# Patient Record
Sex: FEMALE | Race: WHITE | NOT HISPANIC OR LATINO | URBAN - METROPOLITAN AREA
[De-identification: names, ages, dates, MRNs, and addresses within clinical notes are randomized per-mention and may not be internally consistent; named-entity substitution may affect disease eponyms.]

---

## 2017-01-25 ENCOUNTER — COMMUNICATION - HEALTHEAST (OUTPATIENT)
Dept: FAMILY MEDICINE | Facility: CLINIC | Age: 42
End: 2017-01-25

## 2017-01-25 DIAGNOSIS — Z31.41 FERTILITY TESTING: ICD-10-CM

## 2017-02-13 ENCOUNTER — RECORDS - HEALTHEAST (OUTPATIENT)
Dept: ADMINISTRATIVE | Facility: OTHER | Age: 42
End: 2017-02-13

## 2017-10-26 ENCOUNTER — RECORDS - HEALTHEAST (OUTPATIENT)
Dept: ADMINISTRATIVE | Facility: OTHER | Age: 42
End: 2017-10-26

## 2017-10-26 ENCOUNTER — HOSPITAL ENCOUNTER (EMERGENCY)
Facility: CLINIC | Age: 42
Discharge: HOME | End: 2017-10-26
Attending: EMERGENCY MEDICINE | Admitting: EMERGENCY MEDICINE
Payer: COMMERCIAL

## 2017-10-26 VITALS
HEIGHT: 67 IN | SYSTOLIC BLOOD PRESSURE: 112 MMHG | WEIGHT: 125 LBS | TEMPERATURE: 98.9 F | BODY MASS INDEX: 19.62 KG/M2 | HEART RATE: 18 BPM | RESPIRATION RATE: 14 BRPM | OXYGEN SATURATION: 100 % | DIASTOLIC BLOOD PRESSURE: 77 MMHG

## 2017-10-26 DIAGNOSIS — R21 RASH: ICD-10-CM

## 2017-10-26 LAB
ANION GAP SERPL CALCULATED.3IONS-SCNC: 6 MMOL/L (ref 3–14)
BASOPHILS # BLD AUTO: 0.1 10E9/L (ref 0–0.2)
BASOPHILS NFR BLD AUTO: 1.3 %
BUN SERPL-MCNC: 13 MG/DL (ref 7–30)
CALCIUM SERPL-MCNC: 8.5 MG/DL (ref 8.5–10.1)
CHLORIDE SERPL-SCNC: 108 MMOL/L (ref 94–109)
CO2 SERPL-SCNC: 27 MMOL/L (ref 20–32)
CREAT SERPL-MCNC: 0.75 MG/DL (ref 0.52–1.04)
DIFFERENTIAL METHOD BLD: ABNORMAL
EOSINOPHIL # BLD AUTO: 0.3 10E9/L (ref 0–0.7)
EOSINOPHIL NFR BLD AUTO: 8.6 %
ERYTHROCYTE [DISTWIDTH] IN BLOOD BY AUTOMATED COUNT: 13.4 % (ref 10–15)
GFR SERPL CREATININE-BSD FRML MDRD: 86 ML/MIN/1.7M2
GLUCOSE SERPL-MCNC: 66 MG/DL (ref 70–99)
HCT VFR BLD AUTO: 42.5 % (ref 35–47)
HGB BLD-MCNC: 14.5 G/DL (ref 11.7–15.7)
IMM GRANULOCYTES # BLD: 0 10E9/L (ref 0–0.4)
IMM GRANULOCYTES NFR BLD: 0 %
LYMPHOCYTES # BLD AUTO: 1.2 10E9/L (ref 0.8–5.3)
LYMPHOCYTES NFR BLD AUTO: 31.4 %
MCH RBC QN AUTO: 31.9 PG (ref 26.5–33)
MCHC RBC AUTO-ENTMCNC: 34.1 G/DL (ref 31.5–36.5)
MCV RBC AUTO: 93 FL (ref 78–100)
MONOCYTES # BLD AUTO: 0.5 10E9/L (ref 0–1.3)
MONOCYTES NFR BLD AUTO: 12.7 %
NEUTROPHILS # BLD AUTO: 1.8 10E9/L (ref 1.6–8.3)
NEUTROPHILS NFR BLD AUTO: 46 %
NRBC # BLD AUTO: 0 10*3/UL
NRBC BLD AUTO-RTO: 0 /100
PLATELET # BLD AUTO: 256 10E9/L (ref 150–450)
POTASSIUM SERPL-SCNC: 3.5 MMOL/L (ref 3.4–5.3)
RBC # BLD AUTO: 4.55 10E12/L (ref 3.8–5.2)
SODIUM SERPL-SCNC: 141 MMOL/L (ref 133–144)
WBC # BLD AUTO: 3.9 10E9/L (ref 4–11)

## 2017-10-26 PROCEDURE — 96374 THER/PROPH/DIAG INJ IV PUSH: CPT

## 2017-10-26 PROCEDURE — 85025 COMPLETE CBC W/AUTO DIFF WBC: CPT | Performed by: EMERGENCY MEDICINE

## 2017-10-26 PROCEDURE — 99284 EMERGENCY DEPT VISIT MOD MDM: CPT | Mod: 25

## 2017-10-26 PROCEDURE — 86787 VARICELLA-ZOSTER ANTIBODY: CPT | Mod: 91 | Performed by: EMERGENCY MEDICINE

## 2017-10-26 PROCEDURE — 96372 THER/PROPH/DIAG INJ SC/IM: CPT

## 2017-10-26 PROCEDURE — 80048 BASIC METABOLIC PNL TOTAL CA: CPT | Performed by: EMERGENCY MEDICINE

## 2017-10-26 PROCEDURE — S0028 INJECTION, FAMOTIDINE, 20 MG: HCPCS | Performed by: EMERGENCY MEDICINE

## 2017-10-26 PROCEDURE — 25000125 ZZHC RX 250: Performed by: EMERGENCY MEDICINE

## 2017-10-26 PROCEDURE — 86787 VARICELLA-ZOSTER ANTIBODY: CPT | Performed by: EMERGENCY MEDICINE

## 2017-10-26 RX ORDER — ACYCLOVIR 800 MG/1
800 TABLET ORAL
Qty: 35 TABLET | Refills: 0 | Status: SHIPPED | OUTPATIENT
Start: 2017-10-26 | End: 2017-11-02

## 2017-10-26 RX ORDER — EPINEPHRINE 1 MG/ML
0.3 INJECTION, SOLUTION, CONCENTRATE INTRAVENOUS ONCE
Status: DISCONTINUED | OUTPATIENT
Start: 2017-10-26 | End: 2017-10-26

## 2017-10-26 RX ORDER — HYDROXYZINE HYDROCHLORIDE 25 MG/1
25 TABLET, FILM COATED ORAL 3 TIMES DAILY PRN
Qty: 20 TABLET | Refills: 0 | Status: SHIPPED | OUTPATIENT
Start: 2017-10-26

## 2017-10-26 RX ADMIN — FAMOTIDINE 20 MG: 10 INJECTION, SOLUTION INTRAVENOUS at 08:49

## 2017-10-26 RX ADMIN — EPINEPHRINE 0.3 MG: 1 INJECTION INTRAMUSCULAR; INTRAVENOUS; SUBCUTANEOUS at 08:59

## 2017-10-26 ASSESSMENT — ENCOUNTER SYMPTOMS
SORE THROAT: 0
CHILLS: 0
ARTHRALGIAS: 0
NAUSEA: 0
VOMITING: 0
FEVER: 0
RHINORRHEA: 0
HEADACHES: 0
ABDOMINAL PAIN: 0
SHORTNESS OF BREATH: 0
COUGH: 0
MYALGIAS: 0
DYSURIA: 0
FATIGUE: 1

## 2017-10-26 NOTE — ED AVS SNAPSHOT
Emergency Department    [address] [place] [address]    Phone:  [phone]    Fax:  [phone]                                       Madhavi Raya   MRN: [id number]    Department:   Emergency Department   Date of Visit:  10/26/2017           After Visit Summary Signature Page     I have received my discharge instructions, and my questions have been answered. I have discussed any challenges I see with this plan with the nurse or doctor.    ..........................................................................................................................................  Patient/Patient Representative Signature      ..........................................................................................................................................  Patient Representative Print Name and Relationship to Patient    ..................................................               ................................................  Date                                            Time    ..........................................................................................................................................  Reviewed by Signature/Title    ...................................................              ..............................................  Date                                                            Time

## 2017-10-26 NOTE — ED AVS SNAPSHOT
Emergency Department    [address] [facility] [address]    Phone:  [phone]    Fax:  [phone]                                       Madhavi Raya   MRN: [id number]    Department:   Emergency Department   Date of Visit:  10/26/2017           Patient Information     Date Of Birth          1975        Your diagnoses for this visit were:     Rash        You were seen by Terence Dixon MD.      Follow-up Information     Follow up with Jemima Saenz.    Specialty:  Family Practice    Why:  As needed, If symptoms worsen    Contact information:    [facility]  [address] [facility] [id number] [phone]          Follow up with  Emergency Department.    Specialty:  EMERGENCY MEDICINE    Why:  As needed, If symptoms worsen    Contact information:    [address] [facility] [address] [phone]      Discharge References/Attachments     CHICKENPOX (ADULT) (ENGLISH)    CHICKENPOX, UNDERSTANDING (ENGLISH)      24 Hour Appointment Hotline       To make an appointment at any [facility], call [phone] ([phone]). If you don't have a family doctor or clinic, we will help you find one. [facility] clinics are conveniently located to serve the needs of you and your family.             Review of your medicines      START taking        Dose / Directions Last dose taken    acyclovir 800 MG tablet   Commonly known as:  ZOVIRAX   Dose:  800 mg   Quantity:  35 tablet        Take 1 tablet (800 mg) by mouth 5 times daily for 7 days   Refills:  0        hydrOXYzine 25 MG tablet   Commonly known as:  ATARAX   Dose:  25 mg   Quantity:  20 tablet        Take 1 tablet (25 mg) by mouth 3 times daily as needed for itching   Refills:  0          Our records show that you are taking the medicines listed below. If these are incorrect, please call your family doctor or clinic.        Dose / Directions Last dose taken    METHYLPHENIDATE HCL PO         Refills:  0        TRINTELLIX PO        Refills:  0                Prescriptions were sent or printed at these locations (2 Prescriptions)                   Other Prescriptions                Printed at Department/Unit printer (2 of 2)         acyclovir (ZOVIRAX) 800 MG tablet               hydrOXYzine (ATARAX) 25 MG tablet                Procedures and tests performed during your visit     Basic metabolic panel    CBC with platelets differential    IV access    Varicella Zoster Virus Antibody IgG    Varicella zoster antibody IgM      Orders Needing Specimen Collection     None      Pending Results     Date and Time Order Name Status Description    10/26/2017 0836 Varicella zoster antibody IgM In process     10/26/2017 0836 Varicella Zoster Virus Antibody IgG In process             Pending Culture Results     No orders found from 10/24/2017 to 10/27/2017.            Pending Results Instructions     If you had any lab results that were not finalized at the time of your Discharge, you can call the ED Lab Result RN at [phone]. You will be contacted by this team for any positive Lab results or changes in treatment. The nurses are available 7 days a week from 10A to 6:30P.  You can leave a message 24 hours per day and they will return your call.        Test Results From Your Hospital Stay        10/26/2017  8:57 AM      Component Results     Component Value Ref Range & Units Status    WBC 3.9 (L) 4.0 - 11.0 10e9/L Final    RBC Count 4.55 3.8 - 5.2 10e12/L Final    Hemoglobin 14.5 11.7 - 15.7 g/dL Final    Hematocrit 42.5 35.0 - 47.0 % Final    MCV 93 78 - 100 fl Final    MCH 31.9 26.5 - 33.0 pg Final    MCHC 34.1 31.5 - 36.5 g/dL Final    RDW 13.4 10.0 - 15.0 % Final    Platelet Count 256 150 - 450 10e9/L Final    Diff Method Automated Method  Final    % Neutrophils 46.0 % Final    % Lymphocytes 31.4 % Final    % Monocytes 12.7 % Final    % Eosinophils 8.6 % Final    % Basophils 1.3 % Final    % Immature  Granulocytes 0.0 % Final    Nucleated RBCs 0 0 /100 Final    Absolute Neutrophil 1.8 1.6 - 8.3 10e9/L Final    Absolute Lymphocytes 1.2 0.8 - 5.3 10e9/L Final    Absolute Monocytes 0.5 0.0 - 1.3 10e9/L Final    Absolute Eosinophils 0.3 0.0 - 0.7 10e9/L Final    Absolute Basophils 0.1 0.0 - 0.2 10e9/L Final    Abs Immature Granulocytes 0.0 0 - 0.4 10e9/L Final    Absolute Nucleated RBC 0.0  Final         10/26/2017  9:15 AM      Component Results     Component Value Ref Range & Units Status    Sodium 141 133 - 144 mmol/L Final    Potassium 3.5 3.4 - 5.3 mmol/L Final    Chloride 108 94 - 109 mmol/L Final    Carbon Dioxide 27 20 - 32 mmol/L Final    Anion Gap 6 3 - 14 mmol/L Final    Glucose 66 (L) 70 - 99 mg/dL Final    Urea Nitrogen 13 7 - 30 mg/dL Final    Creatinine 0.75 0.52 - 1.04 mg/dL Final    GFR Estimate 86 >60 mL/min/1.7m2 Final    Non  GFR Calc    GFR Estimate If Black >90 >60 mL/min/1.7m2 Final    African American GFR Calc    Calcium 8.5 8.5 - 10.1 mg/dL Final         10/26/2017  8:51 AM         10/26/2017  8:51 AM                Clinical Quality Measure: Blood Pressure Screening     Your blood pressure was checked while you were in the emergency department today. The last reading we obtained was  BP: 112/77 . Please read the guidelines below about what these numbers mean and what you should do about them.  If your systolic blood pressure (the top number) is less than 120 and your diastolic blood pressure (the bottom number) is less than 80, then your blood pressure is normal. There is nothing more that you need to do about it.  If your systolic blood pressure (the top number) is 120-139 or your diastolic blood pressure (the bottom number) is 80-89, your blood pressure may be higher than it should be. You should have your blood pressure rechecked within a year by a primary care provider.  If your systolic blood pressure (the top number) is 140 or greater or your diastolic blood pressure  "(the bottom number) is 90 or greater, you may have high blood pressure. High blood pressure is treatable, but if left untreated over time it can put you at risk for heart attack, stroke, or kidney failure. You should have your blood pressure rechecked by a primary care provider within the next 4 weeks.  If your provider in the emergency department today gave you specific instructions to follow-up with your doctor or provider even sooner than that, you should follow that instruction and not wait for up to 4 weeks for your follow-up visit.        Thank you for choosing [facility]       Thank you for choosing [facility] for your care. Our goal is always to provide you with excellent care. Hearing back from our patients is one way we can continue to improve our services. Please take a few minutes to complete the written survey that you may receive in the mail after you visit with us. Thank you!        [employer] Information     [employer] lets you send messages to your doctor, view your test results, renew your prescriptions, schedule appointments and more. To sign up, go to [email or web address] . Click on \"Log in\" on the left side of the screen, which will take you to the Welcome page. Then click on \"Sign up Now\" on the right side of the page.     You will be asked to enter the access code listed below, as well as some personal information. Please follow the directions to create your username and password.     Your access code is: [email or web address]  Expires: 2018  9:50 AM     Your access code will  in 90 days. If you need help or a new code, please call your [facility] clinic or [phone].        Care EveryWhere ID     This is your Care EveryWhere ID. This could be used by other organizations to access your [facility] medical records  [id number]        Equal Access to Services     LEX BARRAZA : Neyda Villar, hilario gore, janette ireland. So axel " [phone].    ATENCIÓN: Si habla español, tiene a green disposición servicios gratuitos de asistencia lingüística. Llame al [phone].    We comply with applicable federal civil rights laws and [place] laws. We do not discriminate on the basis of race, color, national origin, age, disability, sex, sexual orientation, or gender identity.            After Visit Summary       This is your record. Keep this with you and show to your community pharmacist(s) and doctor(s) at your next visit.

## 2017-10-26 NOTE — LETTER
To Whom it may concern:      Madhavi Raya was seen in our Emergency Department today, 10/26/17.  I expect her condition to improve over the next few days.  She may return to work Oct 30.    Sincerely,        Terence Dixon MD

## 2017-10-26 NOTE — ED PROVIDER NOTES
History     Chief Complaint:  Rash     HPI   Madhavi Raya is an otherwise healthy 42 year old female who presents with a rash. The patient reports she was looking in the mirror yesterday afternoon and noticed a rash on her face and chest. She then checked to see if it was anywhere else on her body and noticed it was all over her body. The rash persisted this morning, so she called the nurse line and was told to go to the ED for evaluation and concern for chicken pox. She states she had chicken pox when she was a child. On arrival, the patient notes an itchy rash all over her body. She denies any discharge or drainage from any of the spots. She states she felt slightly fatigued last night, but attributes this to stress and exhaustion because her mother is ill. She otherwise denies any fever, cough, sore throat, runny nose, headache, congestion, abdominal pain, difficulty breathing, or chest pain. No recent illness. She denies any lesions in her mouth. She denies any new soaps, detergents, clothes, or any other new exposure. She denies any new medications. No recent travel. No one else at home is sick or has a rash. She hasn't taken anything for the rash at home.     Allergies:  No Known Allergies     Medications:    Vortioxetine HBr (TRINTELLIX PO)  METHYLPHENIDATE HCL PO    Past Medical History:    History reviewed. No pertinent past medical history.    Past Surgical History:    History reviewed. No pertinent surgical history.    Family History:    History reviewed. No pertinent family history.     Social History:  Smoking status: Current everyday smoker  Alcohol use: Yes, socially     Review of Systems   Constitutional: Positive for fatigue. Negative for chills and fever.   HENT: Negative for congestion, ear pain, rhinorrhea and sore throat.    Respiratory: Negative for cough and shortness of breath.    Cardiovascular: Negative for chest pain.   Gastrointestinal: Negative for abdominal pain, nausea and vomiting.  "  Genitourinary: Negative for dysuria.   Musculoskeletal: Negative for arthralgias and myalgias.   Skin: Positive for rash.   Neurological: Negative for headaches.   All other systems reviewed and are negative.      Physical Exam   Patient Vitals for the past 24 hrs:   BP Temp Temp src Heart Rate Resp SpO2 Height Weight   10/26/17 0931 - - - - - 100 % - -   10/26/17 0812 139/89 98.9  F (37.2  C) Oral 89 14 99 % 1.702 m (5' 7\") 56.7 kg (125 lb)       Physical Exam  General: Resting comfortably on the gurney  Head:  The scalp, face, and head appear normal  Eyes:  The pupils are equal, round, and reactive to light    There is no nystagmus    Extraocular muscles are intact    Conjunctivae and sclerae are normal  ENT:    The nose is normal    Pinnae are normal    The oropharynx is normal    Uvula is in the midline  Neck:  Normal range of motion    There is no rigidity noted    There is no midline cervical spine pain/tenderness    Trachea is in the midline    No mass is detected  GI:  Abdomen is soft, there is no rigidity    No distension    No tympani    No rebound tenderness     Non-surgical without peritoneal features  Skin:  There are innumerable erythematous papules as shown in the photos below. These involve the face, torso, and extremities.  There are no milli vesicles. These are blanchable and slightly pruritic as can be seen, there appears to be a small circumferential area around each papule that is more pale/white in color. All of the lesions appear to be roughly at the same stage.   Neuro: Speech is normal and fluent  Psych:  Awake. Alert.      Normal affect.  Appropriate interactions.  Lymph: No anterior cervical lymphadenopathy noted              Emergency Department Course   Laboratory:  CBC: WBC 3.9(L), o/w WNL (HGB 14.5, )   BMP: Glucose 66(L), o/w WNL (Creatinine 0.75)    Varicella zoster virus antibody IgG: in process  Varicella zoster antibody IgM: in process    Interventions:  0849: Pepcid 20 " mg IV  0859: Epinephrine 0.3 mg IM    Emergency Department Course:  Past medical records, nursing notes, and vitals reviewed.  0810: I performed an exam of the patient and obtained history, as documented above.    0943: I rechecked the patient. Her itching is improved but rash has not significantly improved.     0952: I rechecked the patient.  Findings and plan explained to the Patient. Patient discharged home with instructions regarding supportive care, medications, and reasons to return. The importance of close follow-up was reviewed.     Impression & Plan      Medical Decision Making:  This patient presents with a rash as noted above. The rash is a papular rash that is diffuse. No milli vesicles are seen, but several of the papules have the appearance that they may become papulovesicles. There is nothing that I can swab at this juncture. The symptoms just began. This could represent early varicella. This does not appear to represent urticaria. The patient had varicella antibody titers drawn. Disseminated zoster is in the differential diagnosis as well, although this is less likely. With her white count being low, this does likely indicate a viral process. She will be started empirically on Acyclovir pending blood work.     Diagnosis:    ICD-10-CM   1. Probably varicella R21   2.  Rash      Disposition: Discharged to home    Discharge Medications:  New Prescriptions    ACYCLOVIR (ZOVIRAX) 800 MG TABLET    Take 1 tablet (800 mg) by mouth 5 times daily for 7 days    HYDROXYZINE (ATARAX) 25 MG TABLET    Take 1 tablet (25 mg) by mouth 3 times daily as needed for itching     Rena Heaton  10/26/2017    EMERGENCY DEPARTMENT    I, Rena Heaton, am serving as a scribe at 8:10 AM on 10/26/2017 to document services personally performed by Terence Dixon MD based on my observations and the provider's statements to me.        Terence Dixon MD  10/26/17 1819

## 2017-10-27 ENCOUNTER — TELEPHONE (OUTPATIENT)
Dept: EMERGENCY MEDICINE | Facility: CLINIC | Age: 42
End: 2017-10-27

## 2017-10-27 LAB — VZV IGG SER QL IA: 2.4 AI (ref 0–0.8)

## 2017-10-27 NOTE — TELEPHONE ENCOUNTER
[facility] Emergency Department Lab result notification:    [facility] ED lab result protocol used  Herpes Zoster / Varicella protocol    Reason for call  Notify of lab results, assess symptoms,  review ED providers recommendations/discharge instructions (if necessary) and advise per ED lab result f/u protocol    Lab Result  Final result for Varicella Zoster Virus Antibody IgM is NEGATIVE.    No treatment or change in treatment per [facility] ED Lab Result protocol.    Final Varicella Zoster Virus Antibody IgG is POSITIVE    Antiviral medications prescribed upon discharge from the [facility] ED: Acyclovir (ZOVIRAX) 800 MG tablet, Take 1 tablet (800 mg) by mouth 5 times daily for 7 days  If treated in the [facility] ED, Notify patient of result.    Information table from ED Provider visit on 10/26/17  Symptoms reported at ED visit (Chief complaint, HPI) Madhavi Raya is an otherwise healthy 42 year old female who presents with a rash. The patient reports she was looking in the mirror yesterday afternoon and noticed a rash on her face and chest. She then checked to see if it was anywhere else on her body and noticed it was all over her body. The rash persisted this morning, so she called the nurse line and was told to go to the ED for evaluation and concern for chicken pox. She states she had chicken pox when she was a child. On arrival, the patient notes an itchy rash all over her body. She denies any discharge or drainage from any of the spots. She states she felt slightly fatigued last night, but attributes this to stress and exhaustion because her mother is ill. She otherwise denies any fever, cough, sore throat, runny nose, headache, congestion, abdominal pain, difficulty breathing, or chest pain. No recent illness. She denies any lesions in her mouth. She denies any new soaps, detergents, clothes, or any other new exposure. She denies any new medications. No recent travel. No one else at home is sick or  "has a rash. She hasn't taken anything for the rash at home.    ED providers Impression and Plan (applicable information) This patient presents with a rash as noted above. The rash is a papular rash that is diffuse. No milli vesicles are seen, but several of the papules have the appearance that they may become papulovesicles. There is nothing that I can swab at this juncture. The symptoms just began. This could represent early varicella. This does not appear to represent urticaria. The patient had varicella antibody titers drawn. Disseminated zoster is in the differential diagnosis as well, although this is less likely. With her white count being low, this does likely indicate a viral process. She will be started empirically on Acyclovir pending blood work.    Miscellaneous information N/A     RN Assessment (Patient s current Symptoms), include time called.  [Insert Left message here if message left]  Madhavi calling for results, did advise IgM still pending.  IgG +, reports she has had chicken pox as a child.  Today has more spots than when seen in ED, now \"more on my hands, ankles, feet and stomach\".  Spots that were affected while seen in ED are \"not as itchy\".  On today, (10/27/17) reports \"I have crazy spots all over my body\".  Did advise of today's results.  Questions answered as able.   Areas initially started with rash, reports improved \"itchiness\".  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    Please Contact your PCP clinic or return to the Emergency department if your:    Symptoms return.    Symptoms worsen or other concerning symptom's.    PCP follow-up Questions asked: YES       Shanelle Morfin RN    [facility] RN  Lung Nodule and ED Lab Results F/U RN  Epic pool (ED late result f/u RN) : [phone] [id number]  Ph # [phone]    Copy of Lab result   Order   Varicella zoster antibody IgM [[id number]] (Order [id number])   Exam " Information   Exam Date Exam Time Accession # Results    10/26/17  8:45 AM [id number]    Component Results   Component Value Flag Ref Range Units Status Collected Lab   Vari Zoster ERIC IGM 0.07  <=0.90 ISR Final 10/26/2017  8:45 AM [id number]   Comment:   (Note)   INTERPRETIVE INFORMATION: Varicella-Zoster Virus Antibody,   IgM    0.90 ISR or less ........ Negative - No significant                              level of detectable                              varicella-zoster virus                              IgM antibody.    0.91-1.09 ISR ........... Equivocal - Repeat testing in                              10-14 days may be helpful.    1.10 ISR or greater ..... Positive - Significant level                              of detectable varicella-zoster                              virus IgM antibody. Indicative                              of current or recent infection.                              However, low levels of IgM                              antibodies may occasionally                              persist for more than 12 months                              post-infection or immunization.        Order   Varicella Zoster Virus Antibody IgG [[id number]] (Order [id number])   Exam Information   Exam Date Exam Time Accession # Results    10/26/17  8:45 AM [id number]    Component Results   Component Value Flag Ref Range Units Status Collected Lab   Varicella Zoster Virus Antibody IgG 2.4 (H) 0.0 - 0.8 AI Final 10/26/2017  8:45 AM 51   Comment:

## 2017-10-28 ENCOUNTER — COMMUNICATION - HEALTHEAST (OUTPATIENT)
Dept: SCHEDULING | Facility: CLINIC | Age: 42
End: 2017-10-28

## 2017-10-28 LAB — VZV IGM SER IA-ACNC: 0.07 ISR

## 2017-10-30 ENCOUNTER — OFFICE VISIT - HEALTHEAST (OUTPATIENT)
Dept: FAMILY MEDICINE | Facility: CLINIC | Age: 42
End: 2017-10-30

## 2017-10-30 DIAGNOSIS — R21 RASH: ICD-10-CM

## 2017-12-27 ENCOUNTER — OFFICE VISIT - HEALTHEAST (OUTPATIENT)
Dept: FAMILY MEDICINE | Facility: CLINIC | Age: 42
End: 2017-12-27

## 2017-12-27 DIAGNOSIS — F90.9 ADHD (ATTENTION DEFICIT HYPERACTIVITY DISORDER): ICD-10-CM

## 2017-12-27 DIAGNOSIS — B36.0 TINEA VERSICOLOR: ICD-10-CM

## 2017-12-27 DIAGNOSIS — F32.A DEPRESSION: ICD-10-CM

## 2017-12-27 DIAGNOSIS — F17.200 TOBACCO USE DISORDER: ICD-10-CM

## 2017-12-28 ENCOUNTER — COMMUNICATION - HEALTHEAST (OUTPATIENT)
Dept: FAMILY MEDICINE | Facility: CLINIC | Age: 42
End: 2017-12-28

## 2018-02-02 ENCOUNTER — COMMUNICATION - HEALTHEAST (OUTPATIENT)
Dept: FAMILY MEDICINE | Facility: CLINIC | Age: 43
End: 2018-02-02

## 2018-02-02 DIAGNOSIS — N30.00 ACUTE CYSTITIS WITHOUT HEMATURIA: ICD-10-CM

## 2018-02-02 LAB
ALBUMIN UR-MCNC: NEGATIVE MG/DL
APPEARANCE UR: CLEAR
BACTERIA #/AREA URNS HPF: ABNORMAL HPF
BILIRUB UR QL STRIP: NEGATIVE
COLOR UR AUTO: YELLOW
GLUCOSE UR STRIP-MCNC: NEGATIVE MG/DL
HGB UR QL STRIP: NEGATIVE
KETONES UR STRIP-MCNC: NEGATIVE MG/DL
LEUKOCYTE ESTERASE UR QL STRIP: ABNORMAL
NITRATE UR QL: POSITIVE
PH UR STRIP: 6.5 [PH] (ref 5–8)
RBC #/AREA URNS AUTO: ABNORMAL HPF
SP GR UR STRIP: 1.02 (ref 1–1.03)
SQUAMOUS #/AREA URNS AUTO: ABNORMAL LPF
UROBILINOGEN UR STRIP-ACNC: ABNORMAL
WBC #/AREA URNS AUTO: ABNORMAL HPF
WBC CLUMPS #/AREA URNS HPF: PRESENT /[HPF]

## 2018-07-06 ENCOUNTER — COMMUNICATION - HEALTHEAST (OUTPATIENT)
Dept: FAMILY MEDICINE | Facility: CLINIC | Age: 43
End: 2018-07-06

## 2018-07-06 DIAGNOSIS — B36.0 TINEA VERSICOLOR: ICD-10-CM

## 2018-07-10 ENCOUNTER — RECORDS - HEALTHEAST (OUTPATIENT)
Dept: ADMINISTRATIVE | Facility: OTHER | Age: 43
End: 2018-07-10

## 2018-08-14 ENCOUNTER — COMMUNICATION - HEALTHEAST (OUTPATIENT)
Dept: FAMILY MEDICINE | Facility: CLINIC | Age: 43
End: 2018-08-14

## 2019-05-07 ENCOUNTER — RECORDS - HEALTHEAST (OUTPATIENT)
Dept: ADMINISTRATIVE | Facility: OTHER | Age: 44
End: 2019-05-07

## 2019-06-11 ENCOUNTER — OFFICE VISIT - HEALTHEAST (OUTPATIENT)
Dept: FAMILY MEDICINE | Facility: CLINIC | Age: 44
End: 2019-06-11

## 2019-06-11 DIAGNOSIS — S81.002D OPEN WOUND OF LEFT KNEE, LEG, AND ANKLE, SUBSEQUENT ENCOUNTER: ICD-10-CM

## 2019-06-11 DIAGNOSIS — S91.002D OPEN WOUND OF LEFT KNEE, LEG, AND ANKLE, SUBSEQUENT ENCOUNTER: ICD-10-CM

## 2019-06-11 DIAGNOSIS — S81.802D OPEN WOUND OF LEFT KNEE, LEG, AND ANKLE, SUBSEQUENT ENCOUNTER: ICD-10-CM

## 2019-06-20 ENCOUNTER — OFFICE VISIT - HEALTHEAST (OUTPATIENT)
Dept: FAMILY MEDICINE | Facility: CLINIC | Age: 44
End: 2019-06-20

## 2019-06-20 DIAGNOSIS — Z48.02 VISIT FOR SUTURE REMOVAL: ICD-10-CM

## 2019-07-12 ENCOUNTER — OFFICE VISIT - HEALTHEAST (OUTPATIENT)
Dept: FAMILY MEDICINE | Facility: CLINIC | Age: 44
End: 2019-07-12

## 2019-07-12 DIAGNOSIS — R21 RASH: ICD-10-CM

## 2019-07-12 DIAGNOSIS — Z20.7 EXPOSURE TO SCABIES: ICD-10-CM

## 2019-09-03 ENCOUNTER — COMMUNICATION - HEALTHEAST (OUTPATIENT)
Dept: FAMILY MEDICINE | Facility: CLINIC | Age: 44
End: 2019-09-03

## 2019-09-04 ENCOUNTER — COMMUNICATION - HEALTHEAST (OUTPATIENT)
Dept: FAMILY MEDICINE | Facility: CLINIC | Age: 44
End: 2019-09-04

## 2019-09-04 DIAGNOSIS — B36.0 TINEA VERSICOLOR: ICD-10-CM

## 2019-09-26 ENCOUNTER — OFFICE VISIT - HEALTHEAST (OUTPATIENT)
Dept: FAMILY MEDICINE | Facility: CLINIC | Age: 44
End: 2019-09-26

## 2019-09-26 DIAGNOSIS — N63.0 LUMP OR MASS IN BREAST: ICD-10-CM

## 2019-09-26 DIAGNOSIS — F17.200 TOBACCO USE DISORDER: ICD-10-CM

## 2019-09-26 DIAGNOSIS — F33.1 MODERATE EPISODE OF RECURRENT MAJOR DEPRESSIVE DISORDER (H): ICD-10-CM

## 2019-09-26 DIAGNOSIS — R03.0 ELEVATED BLOOD PRESSURE READING WITHOUT DIAGNOSIS OF HYPERTENSION: ICD-10-CM

## 2019-09-26 DIAGNOSIS — F43.22 ADJUSTMENT DISORDER WITH ANXIETY: ICD-10-CM

## 2019-09-26 DIAGNOSIS — Z12.31 VISIT FOR SCREENING MAMMOGRAM: ICD-10-CM

## 2019-09-26 ASSESSMENT — MIFFLIN-ST. JEOR: SCORE: 1237.33

## 2019-10-26 ENCOUNTER — COMMUNICATION - HEALTHEAST (OUTPATIENT)
Dept: FAMILY MEDICINE | Facility: CLINIC | Age: 44
End: 2019-10-26

## 2019-10-26 DIAGNOSIS — F17.200 TOBACCO USE DISORDER: ICD-10-CM

## 2019-10-28 ENCOUNTER — HOSPITAL ENCOUNTER (OUTPATIENT)
Dept: ULTRASOUND IMAGING | Facility: CLINIC | Age: 44
Discharge: HOME | End: 2019-10-28
Attending: FAMILY MEDICINE

## 2019-10-28 ENCOUNTER — HOSPITAL ENCOUNTER (OUTPATIENT)
Dept: MAMMOGRAPHY | Facility: CLINIC | Age: 44
Discharge: HOME | End: 2019-10-28
Attending: FAMILY MEDICINE

## 2019-10-28 DIAGNOSIS — Z80.3 FAMILY HISTORY OF BREAST CANCER: ICD-10-CM

## 2019-10-28 DIAGNOSIS — N63.0 BREAST LUMP: ICD-10-CM

## 2020-06-08 ENCOUNTER — RECORDS - HEALTHEAST (OUTPATIENT)
Dept: ADMINISTRATIVE | Facility: OTHER | Age: 45
End: 2020-06-08

## 2021-05-29 NOTE — PROGRESS NOTES
Assessment/Plan:        Diagnoses and all orders for this visit:    Open wound of left knee, leg, and ankle, subsequent encounter    Other orders  -     dextroamphetamine-amphetamine (ADDERALL) 10 mg Tab tablet  I did clean out the wound, and then redressed it without putting any more triple antibiotic.  I counseled her on the right way to dress it which she will continue to do at this time.  Also encouraged her to intermittently leave it open especially if she is going to be at home.  This is delighted to dry and hopefully encourage healing.  She will follow-up with her primary for stitch removal when it is due.        Subjective:    Patient ID: Madhavi Raya is a 43 y.o. female.    Injury   The incident occurred 3 to 5 days ago (Seen at the ER following a fall at which she sustained an injury to the left knee.Seen at the ER and sutured. Comes due to unexpected pain and some redness and wanted a review.). The incident occurred at home. The injury mechanism was a fall and a cut/puncture wound. The injury occurred in the context of work. No protective equipment was used. There is an injury to the left knee. The pain is moderate. It is unlikely that a foreign body is present. Associated symptoms include inability to bear weight. Pertinent negatives include no numbness, tingling or weakness. There have been no prior injuries to these areas. Tetanus status: given at the ER.       The following portions of the patient's history were reviewed and updated as appropriate: allergies, current medications, past family history, past medical history, past social history, past surgical history and problem list.    Review of Systems   Constitutional: Positive for fatigue. Negative for activity change.   HENT: Negative.    Cardiovascular: Negative for leg swelling.   Musculoskeletal: Positive for joint swelling. Negative for back pain.   Skin: Positive for wound.   Neurological: Negative for tingling, weakness and numbness.      Vitals:    06/11/19 1053 06/11/19 1130   BP: (!) 144/98 (!) 144/98   Pulse: 88    Resp: 20    Temp: 98.8  F (37.1  C)    TempSrc: Oral    Weight: 123 lb (55.8 kg)              Objective:    Physical Exam   Constitutional: She appears well-developed and well-nourished. She appears distressed.   Some distress due to pain.   HENT:   Head: Atraumatic.   Neck: Normal range of motion.   Cardiovascular: Intact distal pulses.   Pulmonary/Chest: Effort normal.   Neurological: She is alert.   Skin: Skin is warm.   She has on the anterior aspect of left knee about 2 cm below that a triangular-shaped laceration with the tip pointing medially.  It is held with stitches, some redness around it but not inflammation.  There is good healing and apposition.  There is also moderate amount of an ointment on the surface.  Does have some tenderness.

## 2021-05-29 NOTE — PROGRESS NOTES
ASSESSMENT/PLAN:  1. Visit for suture removal  This is a 43-year-old female who comes in today for suture removal of the left knee.  Sutures were placed about 11 days ago.  No complications with the wound.  Suture removal was completed with removal of 7 stitches without any complication.  Tdap was given in the emergency department 11 days ago    CHIEF COMPLAINT:  Chief Complaint   Patient presents with     [facility]       HISTORY OF PRESENT ILLNESS:  Madhavi is a 43 y.o. female presenting to the clinic today for follow-up post fall and injury to knee that resulted in 7 stitches.     Wound on left knee: On 6/9/2019 she fell into an egress window and her knee hit a metal latch that is used to lock the window. She was seen in the [facility] ED and received 7 stitches inferior to her left knee. She reports that the pain is manageable. She denies pus or fluid drainage. She is excited to have the stitches removed today.     REVIEW OF SYSTEMS:   Constitutional: Negative.   HENT: Negative.   Eyes: Negative.   Respiratory: Negative.   Cardiovascular: Negative.   Gastrointestinal: Negative.   Endocrine: Negative.   Genitourinary: Negative.   Musculoskeletal: Negative.   Skin: Negative.   Allergic/Immunologic: Negative.   Neurological: Negative.   Hematological: Negative.   Psychiatric/Behavioral: Negative.   All other systems are negative.    TOBACCO USE:  Social History     Tobacco Use   Smoking Status Current Some Day Smoker   Smokeless Tobacco Never Used       VITALS:  Vitals:    06/20/19 1510   BP: 134/82   Patient Site: Left Arm   Patient Position: Sitting   Cuff Size: Adult Regular   Pulse: 60   Weight: 120 lb 3 oz (54.5 kg)     Wt Readings from Last 3 Encounters:   06/20/19 120 lb 3 oz (54.5 kg)   06/11/19 123 lb (55.8 kg)   06/09/19 125 lb (56.7 kg)       PHYSICAL EXAM:  Constitutional: Patient is oriented to person, place, and time. Patient appears well-developed and  well-nourished. No distress.   Head: Normocephalic and atraumatic.   Right Ear: External ear normal.   Left Ear: External ear normal.   Nose: Nose normal.   Eyes: Conjunctivae and EOM are normal. Right eye exhibits no discharge. Left eye exhibits no discharge. No scleral icterus.   Neurological: Patient is alert and oriented to person, place, and time. Patient has normal reflexes. No cranial nerve deficit. Coordination normal.   Skin: No rash noted. Patient is not diaphoretic. No erythema. No pallor. Wound inferior to left knee is clean, dry, and intact.       MEDICATIONS:  Current Outpatient Medications   Medication Sig Dispense Refill     clindamycin-benzoyl peroxide (BENZACLIN) gel        dextroamphetamine-amphetamine (ADDERALL) 10 mg Tab tablet        hydrOXYzine (ATARAX) 25 MG tablet Take once daily as needed for anxiety 30 tablet 0     hydrOXYzine pamoate (VISTARIL) 25 MG capsule Take 1 capsule (25 mg total) by mouth every 6 (six) hours as needed for itching. 6 capsule 0     ketoconazole (NIZORAL) 2 % shampoo Apply to affected areas daily as needed 120 mL 3     lisdexamfetamine (VYVANSE) 20 MG capsule Take 1 capsule (20 mg total) by mouth every morning. 30 capsule 0     MULTIVITAMIN ORAL Take 1 tablet by mouth daily.       nicotine (NICODERM CQ) 7 mg/24 hr Place 1 patch on the skin daily. 30 patch 0     nicotine polacrilex (NICORETTE) 2 mg gum Apply 1 each (2 mg total) to the mouth or throat as needed for smoking cessation. 30 each 0     NUVARING 0.12-0.015 mg/24 hr vaginal ring INSERT 1 RING VAGINALLY. KEEP IN PLACE FOR 3 WEEKS, REMOVE FOR 1 WEEK, THEN REPLACE WITH NEW RING 3 each 3     propranolol (INDERAL) 10 MG tablet Take one pill as need for anxiety       TRINTELLIX 10 mg Tab tablet Take 1 tablet (10 mg total) by mouth daily. May increase to 2 ab daily, if ok with psychiatrist 60 tablet 0     No current facility-administered medications for this visit.        ADDITIONAL HISTORY SUMMARIZED (2): 6/9/2019  ED note regarding injury to left knee reviewed.  DECISION TO OBTAIN EXTRA INFORMATION (1): None.   RADIOLOGY TESTS (1): None.  LABS (1): None.  MEDICINE TESTS (1): None.  INDEPENDENT REVIEW (2 each): None.     The visit lasted a total of 5 minutes face to face with the patient. Over 50% of the time was spent counseling and educating the patient about injury to left knee.    INeema, am scribing for and in the presence of, Dr. Velasquez.    I, Dr. Velasquez, personally performed the services described in this documentation, as scribed by Neema Ortiz in my presence, and it is both accurate and complete.    Total data points:2

## 2021-05-31 VITALS — WEIGHT: 123.06 LBS | BODY MASS INDEX: 19.27 KG/M2

## 2021-05-31 VITALS — BODY MASS INDEX: 19.01 KG/M2 | WEIGHT: 121.38 LBS

## 2021-06-01 NOTE — PROGRESS NOTES
ASSESSMENT/PLAN:  Lump or mass in breast  This is a 44-year-old female who comes in today for a palpable lump on her left breast.  On examination today, I was not able to appreciate a lump.  Diagnostic mammogram and ultrasound at this time is not warranted.  Her mom had breast cancer and  recently.  The patient had a mammogram many years ago.  I recommend a screening mammogram as soon as possible.      Nicotine Dependence  Smoking cessation counseled.  Patient had tried bupropion previously but she had side effects which she thought was related to taking Adderall at the same time.  We will try Chantix.  She will continue with nicotine gum.  Motivational interviewing was utilized today  -     varenicline (CHANTIX) 1 mg tablet; Start with 1/2 tab daily x 4 d, 1/2 tab twice daily x 4d, then one tablet twice daily    Moderate episode of recurrent major depressive disorder (H), Adjustment disorder with anxiety  Recent death of her mom from breast cancer.  The patient sees a psychiatrist.  She is on medication for depression and anxiety but is unable to recall the name.  She also sees psychiatry for ADHD    Visit for screening mammogram  -     Mammo Screening Bilateral; Future    Elevated blood pressure reading without diagnosis of hypertension  Monitor for now    Other orders  -     Influenza, Recombinant, Inj, Quadrivalent, PF, 18+YRS        There are no Patient Instructions on file for this visit.    Orders Placed This Encounter   Procedures     Mammo Screening Bilateral     Standing Status:   Future     Standing Expiration Date:   2020     Order Specific Question:   Is the patient pregnant?     Answer:   No     Order Specific Question:   Can the procedure be changed per Radiologist protocol?     Answer:   Yes     Influenza, Recombinant, Inj, Quadrivalent, PF, 18+YRS           CHIEF COMPLAINT:  Chief Complaint   Patient presents with     lump in left breast     x 1 week no pain       HISTORY OF PRESENT  ILLNESS:  Madhavi is a 44 y.o. female presenting to the clinic today for a  lump in her right breast and medication check.     Lump in Right Breast: She noticed a lump on the lateral right breast. She did not injury it and it is not painful. She denies any discharge. She says that her nipples are sensitive but she just got her period so that may be why.     ADHD: She is taking 15 mg of Adderall through her psychiatrist. She also has a short acting Adderall that she thinks is also 15 mg. She likes her dosages for her medications.     Nicotine Dependence: She has not been taking bupropion 150 mg. She stopped taking it because it changed the way adderall made her feel. She has not been using the nicotine patch or gum. If she is going to stop smoking, she would like to get back on medication. However, she would like to find something other than bupropion.      Mental Health: She sees her psychiatrist once every three months. She is no longer taking trintellix. She says that her psychiatrist started her on a new depression medication but she cannot recall what medication.     REVIEW OF SYSTEMS:   Constitutional: Negative.   HENT: Negative.   Eyes: Negative.   Respiratory: Negative.   Cardiovascular: Negative.   Gastrointestinal: Negative.   Endocrine: Negative.   Genitourinary: Negative.   Musculoskeletal: Negative.   Skin: Negative.   Allergic/Immunologic: Negative.   Neurological: Negative.   Hematological: Negative.   Psychiatric/Behavioral: Negative.   All other systems are negative.    PFSH:  Her mom just passed away. Her  was this weekend. Her mom  from breast cancer. Her mom was diagnosed when she was in her 50's. She is still smoking tobacco. She has had one mammogram in the past.      TOBACCO USE:  Social History     Tobacco Use   Smoking Status Current Every Day Smoker     Types: Cigarettes   Smokeless Tobacco Never Used       VITALS:  Vitals:    19 0922 19 0949   BP: (!) 126/98 (!) 140/98  "  Patient Site: Left Arm Left Arm   Patient Position: Sitting Sitting   Cuff Size: Adult Regular Adult Regular   Pulse: 84    Weight: 121 lb 3 oz (55 kg)    Height: 5' 7\" (1.702 m)      Wt Readings from Last 3 Encounters:   09/26/19 121 lb 3 oz (55 kg)   07/12/19 115 lb 14.4 oz (52.6 kg)   06/20/19 120 lb 3 oz (54.5 kg)       PHYSICAL EXAM:  Constitutional: Patient is oriented to person, place, and time. Patient appears well-developed and well-nourished. No distress.   Head: Normocephalic and atraumatic.   Right Ear: External ear normal.   Left Ear: External ear normal.   Nose: Nose normal.   Breasts:  Normal appearing, no skin involvement, no palpable mass, no tenderness on palpation.  No axillary involvement. Firmness of soft tissue at the 10 o'clock position of right breast around 3-4 mm from the nipple.   Neurological: Patient is alert and oriented to person, place, and time.   Skin: Skin is warm and dry. No rash noted. Patient is not diaphoretic. No erythema. No pallor.  The patient has good eye contact.  No psychomotor retardation or stereotypical behaviors.  Speech was regular rate, regular rhythm, adequate responses.  Mood was down and affect was congruent mood.  No suicidal or homicidal intent.  No hallucination.    QUALITY MEASURES:  The following are part of a depression follow up plan for the patient:  case management follow-up and patient follow-up to return when and if necessary  I have counseled the patient for tobacco cessation and prescribed the patient a tobacco cessation medication and the follow up will occur  at the next visit.    ADDITIONAL HISTORY SUMMARIZED (2): Reviewed walk-in note from 7/12/19 about a rash.  DECISION TO OBTAIN EXTRA INFORMATION (1): None.   RADIOLOGY TESTS (1): Ordered a mammogram today.  LABS (1): None.  MEDICINE TESTS (1): None.  INDEPENDENT REVIEW (2 each): None.     Laura HARRIS,  am scribing for and in the presence of, Dr. Velasquez.    IDr. Velasquez, personally " performed the services described in this documentation, as scribed by Laura Crowley in my presence, and it is both accurate and complete.    MEDICATIONS:  Current Outpatient Medications   Medication Sig Dispense Refill     ADDERALL XR 15 mg 24 hr capsule        clindamycin-benzoyl peroxide (BENZACLIN) gel        dextroamphetamine-amphetamine (ADDERALL) 10 mg Tab tablet        ketoconazole (NIZORAL) 2 % shampoo Apply to affected areas daily as needed 120 mL 3     MULTIVITAMIN ORAL Take 1 tablet by mouth daily.       nicotine (NICODERM CQ) 7 mg/24 hr Place 1 patch on the skin daily. 30 patch 0     nicotine polacrilex (NICORETTE) 2 mg gum Apply 1 each (2 mg total) to the mouth or throat as needed for smoking cessation. 30 each 0     varenicline (CHANTIX) 1 mg tablet Start with 1/2 tab daily x 4 d, 1/2 tab twice daily x 4d, then one tablet twice daily 60 tablet 0     No current facility-administered medications for this visit.        Total data points: 3

## 2021-06-01 NOTE — TELEPHONE ENCOUNTER
RN cannot approve Refill Request    RN can NOT refill this medication med is not covered by policy/route to provider. Last office visit: 6/20/2019 Jemima Saenz MD Last Physical: 12/8/2016 Last MTM visit: Visit date not found Last visit same specialty: 6/20/2019 Jemima Saenz MD.  Next visit within 3 mo: Visit date not found  Next physical within 3 mo: Visit date not found      Vicki Del Valle, [facility] Triage/Med Refill 9/4/2019    Requested Prescriptions   Pending Prescriptions Disp Refills     ketoconazole (NIZORAL) 2 % shampoo 120 mL 3     Sig: Apply to affected areas daily as needed       There is no refill protocol information for this order

## 2021-06-02 NOTE — TELEPHONE ENCOUNTER
Refill Approved    Rx renewed per Medication Renewal Policy. Medication was last renewed on 12/27/2017       Sam De La Cruz, [facility] [place] Connection Triage/Med Refill 10/26/2019     Requested Prescriptions   Pending Prescriptions Disp Refills     nicotine (NICODERM CQ) 7 mg/24 hr 30 patch 0     Sig: Place 1 patch on the skin daily.       Nicotine Products Refill Protocol Passed - 10/26/2019  1:42 AM        Passed - PCP or prescribing provider visit in last 12      Last office visit with prescriber/PCP: 9/26/2019 Jemima Saenz MD OR same dept: 9/26/2019 Jemima Saenz MD OR same specialty: 9/26/2019 Jemima Saenz MD  Last physical: 12/8/2016 Last MTM visit: Visit date not found   Next visit within 3 mo: Visit date not found  Next physical within 3 mo: Visit date not found  Prescriber OR PCP: Jemima Covarrubias MD  Last diagnosis associated with med order: 1. Nicotine Dependence  - nicotine (NICODERM CQ) 7 mg/24 hr; Place 1 patch on the skin daily.  Dispense: 30 patch; Refill: 0    If protocol passes may refill for 12 months if within 3 months of last provider visit (or a total of 15 months).

## 2021-06-03 VITALS — BODY MASS INDEX: 18.82 KG/M2 | WEIGHT: 120.19 LBS

## 2021-06-03 VITALS — BODY MASS INDEX: 18.15 KG/M2 | WEIGHT: 115.9 LBS

## 2021-06-03 VITALS
BODY MASS INDEX: 19.02 KG/M2 | WEIGHT: 121.19 LBS | HEIGHT: 67 IN | DIASTOLIC BLOOD PRESSURE: 98 MMHG | SYSTOLIC BLOOD PRESSURE: 140 MMHG | HEART RATE: 84 BPM

## 2021-06-03 VITALS — BODY MASS INDEX: 19.26 KG/M2 | WEIGHT: 123 LBS

## 2021-06-13 NOTE — PROGRESS NOTES
Assessment/Plan:        Diagnoses and all orders for this visit:    Rash    Other orders  -     Cancel: acyclovir (ZOVIRAX) 800 MG tablet; Take 1 tablet (800 mg total) by mouth 2 (two) times a day for 10 days.  Dispense: 20 tablet; Refill: 0        Does not have the full typical appearance of chickenpox.  She was started on acyclovir the next day and could suppress the appearance.  We will have her continue with the cycle of year.  2 continue with hydroxyzine as needed.  Okay to go back to work but if she starts having fever, blister lesions are discharged then to hold off and including precautionary measures with contact with her mom.  Follow-up with PCP if symptoms persist and are worse.  She was agreeable with the plans.  Subjective:    Patient ID: Madhavi Raya is a 42 y.o. female.    TAO Hendrickson is here for follow-up from her recent visit with ER.  She developed a rash 5 days ago.  Looking at her face and noted his bumps in her face.  She then noticed it also involving her neck.  When she tried to check her body, she had it in her chest and abdomen.  The next day, it seems to spread further into her trunks and extremities.  No fever, chills with it.  No blister lesions that she recalls off.  She has had chickenpox in the past.  No exposure to anyone with similar rashes.  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  Most of them are healthy overall.  She is tired recently, exhausted but has been doing a lot of things and bring her mom to [facility] for treatments, chemo.  The lesions are itchy.  She was seen at an ER.  Was treated with acyclovir 800 mg 5 times per day for 7 days.  She was able to start it 4 days ago.  The rashes in her abdomen seems to be less.  Does not have any other new rashes.  She also notes itchiness.  Has been given hydroxyzine which helps.  She has a history of sun allergy and the itching is not as bad as compared to her son allergy.  She denies any new skin  products.  No new pets.  No recent travel.  No exposure to anyone with similar rashes.  No changes in her diet.    Review of Systems  As above otherwise negative.          Objective:    Physical Exam  /70 (Patient Site: Right Arm, Patient Position: Sitting, Cuff Size: Adult Regular)  Pulse 80  Temp 98.2  F (36.8  C)  Wt 121 lb 6 oz (55.1 kg)  Breastfeeding? No  BMI 19.24 kg/m2    Vital signs noted above. AAO ×3.  HEENT no nasal discharge, moist oral mucosa.  No mucosal lesions.  Neck: Supple neck, nonpalpable cervical lymph nodes.  Lungs: Clear to auscultation bilateral.  Heart: S1-S2 regular rate and rhythm, no murmurs were noted.  Abdomen:  with bowel sounds.  Extremities: No edema, pulses were full and equal.  Skin: Erythematous maculopapular lesions involving her trunks, extremities.  Minimally on her hands, not on her palmar region.  No vesicular lesions, discharge, bleeding.  A few of them had dry scab but possibly from her scratching on it.

## 2021-06-15 NOTE — PROGRESS NOTES
ASSESSMENT/PLAN:  Nicotine Dependence  Cessation counseled  She has wellbutrin at home and will resume it  -     nicotine (NICODERM CQ) 7 mg/24 hr; Place 1 patch on the skin daily.  Dispense: 30 patch; Refill: 0  -     nicotine polacrilex (NICORETTE) 2 mg gum; Apply 1 each (2 mg total) to the mouth or throat as needed for smoking cessation.  Dispense: 30 each; Refill: 0  -     nicotine (NICODERM CQ) 14 mg/24 hr; Place 1 patch on the skin daily.  Dispense: 30 patch; Refill: 0    ADHD (attention deficit hyperactivity disorder)  Refilled.  Further refills will be through her psychiatry  -     lisdexamfetamine (VYVANSE) 20 MG capsule; Take 1 capsule (20 mg total) by mouth every morning.  Dispense: 30 capsule; Refill: 0    Tinea versicolor  refilled  -     ketoconazole (NIZORAL) 2 % shampoo; Apply to affected areas daily as needed  Dispense: 120 mL; Refill: 3    Depression  PHQ9=13  Refilled  Mom recently  from complications of cancer.  May increase to 20mg, but must get ok by her psychiatrist  -     TRINTELLIX 10 mg Tab tablet; Take 1 tablet (10 mg total) by mouth daily. May increase to 2 ab daily, if ok with psychiatrist  Dispense: 60 tablet; Refill: 0    CHIEF COMPLAINT:  Chief Complaint   Patient presents with     med check       HISTORY OF PRESENT ILLNESS:  Madhavi is a 42 y.o. female presenting to the clinic today for a follow-up of anxiety and depression. Her mother recently passed from cancer. She notes that her mood is labile with her mother's passing but has a strong family support system. She wanted to ask her psychiatrist if she could increase her Trintellix dose for a temporary amount of time while she bryanna. She is inquiring if she could receive refills on her prescriptions as she has reached her out-of-pocket deductible.     ADHD: She states that her Vyvanse is helpful for her ADHD.     REVIEW OF SYSTEMS:   Constitutional: Negative.   HENT: Negative.   Eyes: Negative.   Respiratory: Negative.    Cardiovascular: Negative.   Gastrointestinal: Negative.   Endocrine: Negative.   Genitourinary: Negative.   Musculoskeletal: Negative.   Skin: She is inquiring about ketoconazole for her tinea versicolor.   Allergic/Immunologic: Negative.   Neurological: Negative.   All other systems are negative.    PFSH:  Smoking Cessation: She notes that she has started smoking again but would like to stop. She is inquiring about a prescription for smoking cessation. She has previously taken Wellbutrin which improved her ability to quit smoking. She also would like nicotine gum and patches.      TOBACCO USE:  History   Smoking Status     Current Some Day Smoker   Smokeless Tobacco     Never Used       VITALS:  Vitals:    12/27/17 1506   BP: 144/90   Pulse: 64   Weight: 123 lb 1 oz (55.8 kg)     Wt Readings from Last 3 Encounters:   12/27/17 123 lb 1 oz (55.8 kg)   10/30/17 121 lb 6 oz (55.1 kg)   12/08/16 120 lb 5 oz (54.6 kg)       PHYSICAL EXAM:  Constitutional: Patient is oriented to person, place, and time. Patient appears well-developed and well-nourished. No distress.   Cardiovascular: Normal rate.  Pulmonary/Chest: Effort normal. No respiratory distress.   Neurological: Patient is alert and oriented to person, place, and time.  The patient has good eye contact.  No psychomotor retardation or stereotypical behaviors.  Speech is regular rate, regular rhythm, adequate responses.  Mood is stable and affect is congruent mood.  No suicidal or homicidal intent.  No hallucination.      QUALITY MEASURES:  I have counseled the patient for tobacco cessation and the follow up will occur  at the next visit.    ADDITIONAL HISTORY SUMMARIZED (2): None.  DECISION TO OBTAIN EXTRA INFORMATION (1): None.   RADIOLOGY TESTS (1): None.  LABS (1): None.  MEDICINE TESTS (1): None.  INDEPENDENT REVIEW (2 each): None.     Onofre HARRIS, am scribing for and in the presence of, Dr. Velasquez.    Jemima HARRIS MD , personally  performed the services described in this documentation, as scribed by Argelia Guerin in my presence, and it is both accurate and complete.    MEDICATIONS:  Current Outpatient Prescriptions   Medication Sig Dispense Refill     clindamycin-benzoyl peroxide (BENZACLIN) gel        ketoconazole (NIZORAL) 2 % shampoo Apply to affected areas daily as needed 120 mL 3     lisdexamfetamine (VYVANSE) 20 MG capsule Take 1 capsule (20 mg total) by mouth every morning. 30 capsule 0     MULTIVITAMIN ORAL Take 1 tablet by mouth daily.       propranolol (INDERAL) 10 MG tablet Take one pill as need for anxiety       TRINTELLIX 10 mg Tab tablet Take 1 tablet (10 mg total) by mouth daily. May increase to 2 ab daily, if ok with psychiatrist 60 tablet 0     hydrOXYzine (ATARAX) 25 MG tablet Take once daily as needed for anxiety 30 tablet 0     nicotine (NICODERM CQ) 14 mg/24 hr Place 1 patch on the skin daily. 30 patch 0     nicotine (NICODERM CQ) 7 mg/24 hr Place 1 patch on the skin daily. 30 patch 0     nicotine polacrilex (NICORETTE) 2 mg gum Apply 1 each (2 mg total) to the mouth or throat as needed for smoking cessation. 30 each 0     NUVARING 0.12-0.015 mg/24 hr vaginal ring INSERT PER VAGINA EVERY WEEK FOR 3 WEEKS. REMOVE FOR 1 WEEK THEN INSERT A NEW ONE AS DIRECTED 3 each 0     No current facility-administered medications for this visit.        Total data points:0

## 2021-06-17 NOTE — PATIENT INSTRUCTIONS - HE
Patient Instructions by Matthew Bryan PA-C at 7/12/2019 10:10 AM     Author: Matthew Bryan PA-C Service: -- Author Type: Physician Assistant    Filed: 7/12/2019 10:39 AM Encounter Date: 7/12/2019 Status: Addendum    : Matthew Bryan PA-C (Physician Assistant)    Related Notes: Original Note by Matthew Bryan PA-C (Physician Assistant) filed at 7/12/2019 10:39 AM       Avoid skin to skin contact while being treated.  You may continue to work as long as you do not have skin to skin contact.  Use of over-the-counter Zyrtec for the next 2 weeks for itching.  Itching may persist after the treatment.  Follow-up with your primary care provider if not getting 100% resolution of new symptoms or concerns arise.      Patient Education     Scabies  Scabies is a skin infection. It is caused by a tiny parasitic insect, or mite, that is too small to see directly. It can be seen under a microscope, but it is usually recognized only by the rash and symptoms it causes. This can make it hard to diagnose since the signs and symptoms can be similar to other diseases.  The scabies mite tunnels under the skin. It creates a small burrow, where it leaves its eggs. These eggs li and grow into adults. They then create new burrows over the next 1 to 2 weeks. The mites die in about 4 to 6 weeks. The rash and itching are caused by an allergic reaction to the scabies saliva or feces.  Scabies is highly contagious. It is spread by direct skin contact. It is easily spread by close personal contact, sexual contact, or by sharing bed linens or clothing used by an infected person.  It may take 4 to 6 weeks for symptoms to appear after being exposed. Everyone living in the house with you, as well as your sexual partners, should be treated at the same time. After the first treatment, you will no longer be contagious. You may return to work, school or .  Home care    Machine wash in hot water all sheets, towels, pillowcases, underwear,  pajamas, and any other clothing you have worn lately. Use the hot cycle of a dryer or use a hot iron to sterilize.    Seal anything that is hard to wash in a plastic trash bag for 4 days. This includes coats, jackets, blankets, and bedspreads. (The insects die after 3 days off the human body.)  Medicines  Scabicides  Medicines used to treat scabies are called scabicides. These are creams that kill the scabies mites. A prescription is needed. When using these medicines:    Always follow instructions provided by your healthcare provider and pharmacist. Also follow the printed instructions that come with the medicine.    Talk with your provider about precautions to take when using these medicines.    Use the cream on your body when your skin is cool and dry. Dont use it after a hot shower or bath.    Usually the cream is put on your whole body. This means from your chin all the way down to your toes. Scabies does not usually affect an adults head. So cream is not needed there. For children, discuss this with your marck provider.    Leave the cream or lotion on for the recommended amount of time. This is usually 8 to 12 hours.    Dont leave cream or lotion on your skin longer than directed. Dont use more than recommended.    Clean clothes should be worn after the treatment.    If you wash your hands after using the cream, you will need to reapply the cream to your hands.    If you are breastfeeding, wash off your nipples before feeding. Then reapply the cream after breastfeeding.    For babies or infants, put mittens on their hands. This will stop them from licking the cream or lotion. It will also stop them from scratching themselves because of the itching.  Other medicines    An oral medicine called ivermectin may be prescribed for severe cases. It may also be used if you cant apply creams.    Itching may cause the most discomfort. If large areas of your skin are affected, over-the-counter antihistamines may be used  to reduce itching. Or you may be given a prescription antihistamine. Some of these medicines may make you sleepy. They are best used at bedtime. Antihistamines that dont make you sleepy can be used during the day. Note: Dont use medicine that has diphenhydramine if you have glaucoma, or if you are a man who has trouble urinating due to an enlarged prostate.    If you were given antibiotics due to a bacterial infection, take them until they are finished. It is important to finish the antibiotics even if the wound looks better. This is to make sure the infection has cleared.  Follow-up care  Follow up with your healthcare provider, or as advised. Call your provider if your symptoms dont improve after 1 week, or if new burrows or rashes appear.  When to seek medical advice  Call your healthcare provider right away if any of these occur:    Yellow-brown crusts or drainage from the sores    Other signs of infection, including increasing redness, swelling, pain, or pus    Fever of 100.4 F (38 C) or higher, or as directed by your provider  Date Last Reviewed: 8/1/2016 2000-2017 [employer] [address] [place] [address]. All rights reserved. This information is not intended as a substitute for professional medical care. Always follow your healthcare professional's instructions.           Patient Education     Scabies     To prevent spread of infection, wash clothing, linens, and toys in very hot water.     Scabies is an infection caused by very tiny mites that burrow into the skin. The mites are called Sarcoptes scabiei. They cause severe itching. Though children are most commonly infected, anyone can get scabies. Scabies mites can pass from person to person through close physical contact. They can also be passed through shared clothing, towels, and bedding. Scabies infection is not usually dangerous, but it is uncomfortable. Because it is so contagious, scabies should be treated immediately to  keep the infection from spreading.  Symptoms  Symptoms of scabies appear about 2 to 6 weeks after infection in a child or adult who has never had scabies before. A child or adult who has been infected before will experience symptoms much sooner, in 1 to 4 days. Signs of scabies infection may include:    Intense itching, especially at night or after a hot bath    Skin irritations that look like hives, insect bites, pimples, or blisters, especially on warmer areas of the body (such as between the fingers, in the armpits, and in the creases of the wrists, elbows, and knees)    Sores on the body caused by scratching (the sores may become infected)    Cathay created by mites traveling under the skin, which look like lines on the skins surface  Treating scabies infection  Scabies infections are usually treated with a prescription lotion that kills the mites. The lotion must be applied to the entire body from the neck down. This includes the palms of the hands, soles of the feet, groin, and under the fingernails. The lotion must be left on for 8 to 14 hours. In some cases, a second application of lotion is needed a week after the first. Medicines work quickly, but most children and adults continue to have an itchy rash for several weeks after treatment. Marks on the skin from scabies usually go away in 1 to 2 weeks, but sometimes take a few months to clear.  Preventing spread of the infection  To prevent reinfection and the spread of scabies to others, follow these instructions:    Wash the infected persons clothing, towels, bed linens, cloth toys, and other personal items in very hot, soapy water. Dry them thoroughly. Do not share among family members.     Seal items that cant be washed in plastic bags for 2 weeks.    Vacuum floors and furniture. Throw the vacuum bag away afterward.    Notify an infected marck school and caregivers so that other children can be checked and treated.    Keep an infected child home from   or school until the morning after treatment for scabies.    Warn children not to share items such as clothing and towels with other children.    You may need to treat all household members who may have been exposed to scabies, whether they show symptoms or not. Talk with your healthcare provider.    Do not spray your house with chemicals or pesticides. These can be dangerous to your familys health.  When to call the healthcare provider if:    The infected person has a fever, red streaks, pain, or swelling of the skin.    Sores get worse or do not heal.    New rashes appear or itching continues for more than 2 weeks after treatment.   Date Last Reviewed: 6/1/2016 2000-2017 [employer]. [address] [place] [address], [place] [address]. All rights reserved. This information is not intended as a substitute for professional medical care. Always follow your healthcare professional's instructions.

## 2021-06-19 NOTE — LETTER
Letter by Rainer Kramer MD at      Author: Rainer Kramer MD Service: -- Author Type: --    Filed:  Encounter Date: 6/11/2019 Status: (Other)         June 11, 2019     Patient: Madhavi Raya   YOB: 1975   Date of Visit: 6/11/2019       To Whom it May Concern:    Madhavi Raya was seen in my clinic on 6/11/2019.She is doing better but still not able to be at work for the next 3 days to enable better healing.This is between today 06/11 till the 14 th of June 2019.    If you have any questions or concerns, please don't hesitate to call.    Sincerely,         Electronically signed by Rainer Kramer MD

## 2021-06-19 NOTE — LETTER
Letter by Jemima Saenz MD at      Author: Jemima Saenz MD Service: -- Author Type: --    Filed:  Encounter Date: 9/26/2019 Status: Signed         September 26, 2019     Patient: Madhavi Raya   YOB: 1975   Date of Visit: 9/26/2019       To Whom it May Concern:    Madhavi Raya was seen in my clinic on 9/26/2019.     If you have any questions or concerns, please don't hesitate to call.    Sincerely,         Electronically signed by Jemima Covarrubias MD

## 2021-06-27 NOTE — PROGRESS NOTES
Progress Notes by Matthew Bryan PA-C at 7/12/2019 10:10 AM     Author: Matthew Bryan PA-C Service: -- Author Type: Physician Assistant    Filed: 7/12/2019  1:34 PM Encounter Date: 7/12/2019 Status: Signed    : Matthew Bryan PA-C (Physician Assistant)       Subjective:      Patient ID: Madhavi Raya is a 43 y.o. female.    Chief Complaint:    HPI  Madhavi Raya is a 43 y.o. female who presents today complaining of concern for exposure to scabies and possible scabies rash.  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  She shares that she has had a scabies treatment 2 months ago because of the exposure.  She states the symptoms have been very similar.  She has a distribution over the anterior posterior torso the axilla and in the inguinal area.  The rash is very itchy.  She has not tried treatment for it at home yet.    No past medical history on file.    No past surgical history on file.    Family History   Problem Relation Age of Onset   ? Diabetes Other         family history of   ? Breast cancer Mother        Social History     Tobacco Use   ? Smoking status: Current Every Day Smoker     Types: Cigarettes   ? Smokeless tobacco: Never Used   Substance Use Topics   ? Alcohol use: Not on file   ? Drug use: Not on file       Review of Systems  As above in HPI, otherwise balance of Review of Systems are negative.    Objective:     /88   Pulse 97   Temp 98.7  F (37.1  C) (Oral)   Resp 12   Wt 115 lb 14.4 oz (52.6 kg)   SpO2 100%   BMI 18.15 kg/m      Physical Exam  General: Patient is resting comfortably no acute distress is afebrile  HEENT: Head is normocephalic atraumatic   eyes are PERRL EOMI sclera anicteric   Skin: With small maculopapular type rash over the bilateral axilla the stomach around the waist in the inguinal area and on the bilateral  forearms around the wrists in the popliteal fossa of the knees.      Assessment:     Procedures    The primary encounter diagnosis was Rash. A diagnosis of Exposure to scabies was also pertinent to this visit.    Plan:     1. Rash  permethrin (ELIMITE) 5 % cream   2. Exposure to scabies  permethrin (ELIMITE) 5 % cream       The patient has had exposure to scabies and the distribution also appears to be consistent with scabies.  Therefore she will be treated empirically with the permethrin.  She will follow suggestions in the patient handouts as below.  She is instructed to use over-the-counter Zyrtec for antihistamine effect for the itching after treatment with the scabies.  She can return to clinic or with her primary care provider for reevaluation treatment if not getting 100% resolution.  Questions were answered to patient's satisfaction before discharge.    Patient Instructions     Avoid skin to skin contact while being treated.  You may continue to work as long as you do not have skin to skin contact.  Use of over-the-counter Zyrtec for the next 2 weeks for itching.  Itching may persist after the treatment.  Follow-up with your primary care provider if not getting 100% resolution of new symptoms or concerns arise.      Patient Education     Scabies  Scabies is a skin infection. It is caused by a tiny parasitic insect, or mite, that is too small to see directly. It can be seen under a microscope, but it is usually recognized only by the rash and symptoms it causes. This can make it hard to diagnose since the signs and symptoms can be similar to other diseases.  The scabies mite tunnels under the skin. It creates a small burrow, where it leaves its eggs. These eggs li and grow into adults. They then create new burrows over the next 1 to 2 weeks. The mites die in about 4 to 6 weeks. The rash and itching are caused by an allergic reaction to the scabies saliva or feces.  Scabies is highly contagious. It is  spread by direct skin contact. It is easily spread by close personal contact, sexual contact, or by sharing bed linens or clothing used by an infected person.  It may take 4 to 6 weeks for symptoms to appear after being exposed. Everyone living in the house with you, as well as your sexual partners, should be treated at the same time. After the first treatment, you will no longer be contagious. You may return to work, school or .  Home care    Machine wash in hot water all sheets, towels, pillowcases, underwear, pajamas, and any other clothing you have worn lately. Use the hot cycle of a dryer or use a hot iron to sterilize.    Seal anything that is hard to wash in a plastic trash bag for 4 days. This includes coats, jackets, blankets, and bedspreads. (The insects die after 3 days off the human body.)  Medicines  Scabicides  Medicines used to treat scabies are called scabicides. These are creams that kill the scabies mites. A prescription is needed. When using these medicines:    Always follow instructions provided by your healthcare provider and pharmacist. Also follow the printed instructions that come with the medicine.    Talk with your provider about precautions to take when using these medicines.    Use the cream on your body when your skin is cool and dry. Dont use it after a hot shower or bath.    Usually the cream is put on your whole body. This means from your chin all the way down to your toes. Scabies does not usually affect an adults head. So cream is not needed there. For children, discuss this with your mar[facility] provider.    Leave the cream or lotion on for the recommended amount of time. This is usually 8 to 12 hours.    Dont leave cream or lotion on your skin longer than directed. Dont use more than recommended.    Clean clothes should be worn after the treatment.    If you wash your hands after using the cream, you will need to reapply the cream to your hands.    If you are breastfeeding, wash  off your nipples before feeding. Then reapply the cream after breastfeeding.    For babies or infants, put mittens on their hands. This will stop them from licking the cream or lotion. It will also stop them from scratching themselves because of the itching.  Other medicines    An oral medicine called ivermectin may be prescribed for severe cases. It may also be used if you cant apply creams.    Itching may cause the most discomfort. If large areas of your skin are affected, over-the-counter antihistamines may be used to reduce itching. Or you may be given a prescription antihistamine. Some of these medicines may make you sleepy. They are best used at bedtime. Antihistamines that dont make you sleepy can be used during the day. Note: Dont use medicine that has diphenhydramine if you have glaucoma, or if you are a man who has trouble urinating due to an enlarged prostate.    If you were given antibiotics due to a bacterial infection, take them until they are finished. It is important to finish the antibiotics even if the wound looks better. This is to make sure the infection has cleared.  Follow-up care  Follow up with your healthcare provider, or as advised. Call your provider if your symptoms dont improve after 1 week, or if new burrows or rashes appear.  When to seek medical advice  Call your healthcare provider right away if any of these occur:    Yellow-brown crusts or drainage from the sores    Other signs of infection, including increasing redness, swelling, pain, or pus    Fever of 100.4 F (38 C) or higher, or as directed by your provider  Date Last Reviewed: 8/1/2016 2000-2017 [employer]. [address] [place] [address], [place] [address]. All rights reserved. This information is not intended as a substitute for professional medical care. Always follow your healthcare professional's instructions.           Patient Education     Scabies     To prevent spread of infection, wash clothing, linens, and  toys in very hot water.     Scabies is an infection caused by very tiny mites that burrow into the skin. The mites are called Sarcoptes scabiei. They cause severe itching. Though children are most commonly infected, anyone can get scabies. Scabies mites can pass from person to person through close physical contact. They can also be passed through shared clothing, towels, and bedding. Scabies infection is not usually dangerous, but it is uncomfortable. Because it is so contagious, scabies should be treated immediately to keep the infection from spreading.  Symptoms  Symptoms of scabies appear about 2 to 6 weeks after infection in a child or adult who has never had scabies before. A child or adult who has been infected before will experience symptoms much sooner, in 1 to 4 days. Signs of scabies infection may include:    Intense itching, especially at night or after a hot bath    Skin irritations that look like hives, insect bites, pimples, or blisters, especially on warmer areas of the body (such as between the fingers, in the armpits, and in the creases of the wrists, elbows, and knees)    Sores on the body caused by scratching (the sores may become infected)    [place] created by mites traveling under the skin, which look like lines on the skins surface  Treating scabies infection  Scabies infections are usually treated with a prescription lotion that kills the mites. The lotion must be applied to the entire body from the neck down. This includes the palms of the hands, soles of the feet, groin, and under the fingernails. The lotion must be left on for 8 to 14 hours. In some cases, a second application of lotion is needed a week after the first. Medicines work quickly, but most children and adults continue to have an itchy rash for several weeks after treatment. Marks on the skin from scabies usually go away in 1 to 2 weeks, but sometimes take a few months to clear.  Preventing spread of the infection  To prevent  reinfection and the spread of scabies to others, follow these instructions:    Wash the infected persons clothing, towels, bed linens, cloth toys, and other personal items in very hot, soapy water. Dry them thoroughly. Do not share among family members.     Seal items that cant be washed in plastic bags for 2 weeks.    Vacuum floors and furniture. Throw the vacuum bag away afterward.    Notify an infected marck school and caregivers so that other children can be checked and treated.    Keep an infected child home from  or school until the morning after treatment for scabies.    Warn children not to share items such as clothing and towels with other children.    You may need to treat all household members who may have been exposed to scabies, whether they show symptoms or not. Talk with your healthcare provider.    Do not spray your house with chemicals or pesticides. These can be dangerous to your familys health.  When to call the healthcare provider if:    The infected person has a fever, red streaks, pain, or swelling of the skin.    Sores get worse or do not heal.    New rashes appear or itching continues for more than 2 weeks after treatment.   Date Last Reviewed: 6/1/2016 2000-2017 [employer]. [address] [place] [address], [place] [address]. All rights reserved. This information is not intended as a substitute for professional medical care. Always follow your healthcare professional's instructions.

## 2021-08-14 ENCOUNTER — HEALTH MAINTENANCE LETTER (OUTPATIENT)
Age: 46
End: 2021-08-14

## 2021-10-09 ENCOUNTER — HEALTH MAINTENANCE LETTER (OUTPATIENT)
Age: 46
End: 2021-10-09

## 2021-12-13 ENCOUNTER — TRANSFERRED RECORDS (OUTPATIENT)
Dept: HEALTH INFORMATION MANAGEMENT | Facility: CLINIC | Age: 46
End: 2021-12-13
Payer: COMMERCIAL

## 2022-03-30 ENCOUNTER — TRANSFERRED RECORDS (OUTPATIENT)
Dept: HEALTH INFORMATION MANAGEMENT | Facility: CLINIC | Age: 47
End: 2022-03-30
Payer: COMMERCIAL

## 2022-09-17 ENCOUNTER — HEALTH MAINTENANCE LETTER (OUTPATIENT)
Age: 47
End: 2022-09-17

## 2023-10-07 ENCOUNTER — HEALTH MAINTENANCE LETTER (OUTPATIENT)
Age: 48
End: 2023-10-07